# Patient Record
Sex: MALE | Race: WHITE | NOT HISPANIC OR LATINO | Employment: FULL TIME | URBAN - METROPOLITAN AREA
[De-identification: names, ages, dates, MRNs, and addresses within clinical notes are randomized per-mention and may not be internally consistent; named-entity substitution may affect disease eponyms.]

---

## 2020-02-27 ENCOUNTER — OFFICE VISIT (OUTPATIENT)
Dept: FAMILY MEDICINE | Facility: CLINIC | Age: 30
End: 2020-02-27
Payer: COMMERCIAL

## 2020-02-27 ENCOUNTER — LAB VISIT (OUTPATIENT)
Dept: LAB | Facility: HOSPITAL | Age: 30
End: 2020-02-27
Attending: NURSE PRACTITIONER
Payer: COMMERCIAL

## 2020-02-27 VITALS
SYSTOLIC BLOOD PRESSURE: 110 MMHG | OXYGEN SATURATION: 98 % | WEIGHT: 202 LBS | BODY MASS INDEX: 28.92 KG/M2 | HEIGHT: 70 IN | DIASTOLIC BLOOD PRESSURE: 70 MMHG | HEART RATE: 63 BPM

## 2020-02-27 DIAGNOSIS — G44.89 OTHER HEADACHE SYNDROME: Primary | ICD-10-CM

## 2020-02-27 DIAGNOSIS — G44.89 OTHER HEADACHE SYNDROME: ICD-10-CM

## 2020-02-27 LAB
ALBUMIN SERPL BCP-MCNC: 4.3 G/DL (ref 3.5–5.2)
ALP SERPL-CCNC: 55 U/L (ref 55–135)
ALT SERPL W/O P-5'-P-CCNC: 21 U/L (ref 10–44)
ANION GAP SERPL CALC-SCNC: 7 MMOL/L (ref 8–16)
AST SERPL-CCNC: 21 U/L (ref 10–40)
BASOPHILS # BLD AUTO: 0.02 K/UL (ref 0–0.2)
BASOPHILS NFR BLD: 0.3 % (ref 0–1.9)
BILIRUB SERPL-MCNC: 1.1 MG/DL (ref 0.1–1)
BUN SERPL-MCNC: 22 MG/DL (ref 6–20)
CALCIUM SERPL-MCNC: 9.2 MG/DL (ref 8.7–10.5)
CHLORIDE SERPL-SCNC: 103 MMOL/L (ref 95–110)
CO2 SERPL-SCNC: 28 MMOL/L (ref 23–29)
CREAT SERPL-MCNC: 1.2 MG/DL (ref 0.5–1.4)
DIFFERENTIAL METHOD: ABNORMAL
EOSINOPHIL # BLD AUTO: 0 K/UL (ref 0–0.5)
EOSINOPHIL NFR BLD: 0 % (ref 0–8)
ERYTHROCYTE [DISTWIDTH] IN BLOOD BY AUTOMATED COUNT: 12.2 % (ref 11.5–14.5)
EST. GFR  (AFRICAN AMERICAN): >60 ML/MIN/1.73 M^2
EST. GFR  (NON AFRICAN AMERICAN): >60 ML/MIN/1.73 M^2
GLUCOSE SERPL-MCNC: 162 MG/DL (ref 70–110)
HCT VFR BLD AUTO: 46.1 % (ref 40–54)
HGB BLD-MCNC: 15.5 G/DL (ref 14–18)
IMM GRANULOCYTES # BLD AUTO: 0.02 K/UL (ref 0–0.04)
IMM GRANULOCYTES NFR BLD AUTO: 0.3 % (ref 0–0.5)
LYMPHOCYTES # BLD AUTO: 0.8 K/UL (ref 1–4.8)
LYMPHOCYTES NFR BLD: 10.1 % (ref 18–48)
MCH RBC QN AUTO: 31.2 PG (ref 27–31)
MCHC RBC AUTO-ENTMCNC: 33.6 G/DL (ref 32–36)
MCV RBC AUTO: 93 FL (ref 82–98)
MONOCYTES # BLD AUTO: 0.1 K/UL (ref 0.3–1)
MONOCYTES NFR BLD: 0.9 % (ref 4–15)
NEUTROPHILS # BLD AUTO: 6.8 K/UL (ref 1.8–7.7)
NEUTROPHILS NFR BLD: 88.4 % (ref 38–73)
NRBC BLD-RTO: 0 /100 WBC
PLATELET # BLD AUTO: 139 K/UL (ref 150–350)
PMV BLD AUTO: 11.6 FL (ref 9.2–12.9)
POTASSIUM SERPL-SCNC: 4 MMOL/L (ref 3.5–5.1)
PROT SERPL-MCNC: 7 G/DL (ref 6–8.4)
RBC # BLD AUTO: 4.97 M/UL (ref 4.6–6.2)
SODIUM SERPL-SCNC: 138 MMOL/L (ref 136–145)
WBC # BLD AUTO: 7.69 K/UL (ref 3.9–12.7)

## 2020-02-27 PROCEDURE — 3008F BODY MASS INDEX DOCD: CPT | Mod: S$GLB,,, | Performed by: NURSE PRACTITIONER

## 2020-02-27 PROCEDURE — 80053 COMPREHEN METABOLIC PANEL: CPT

## 2020-02-27 PROCEDURE — 99203 OFFICE O/P NEW LOW 30 MIN: CPT | Mod: 25,S$GLB,, | Performed by: NURSE PRACTITIONER

## 2020-02-27 PROCEDURE — 96372 PR INJECTION,THERAP/PROPH/DIAG2ST, IM OR SUBCUT: ICD-10-PCS | Mod: S$GLB,,, | Performed by: NURSE PRACTITIONER

## 2020-02-27 PROCEDURE — 3008F PR BODY MASS INDEX (BMI) DOCUMENTED: ICD-10-PCS | Mod: S$GLB,,, | Performed by: NURSE PRACTITIONER

## 2020-02-27 PROCEDURE — 96372 THER/PROPH/DIAG INJ SC/IM: CPT | Mod: S$GLB,,, | Performed by: NURSE PRACTITIONER

## 2020-02-27 PROCEDURE — 36415 COLL VENOUS BLD VENIPUNCTURE: CPT

## 2020-02-27 PROCEDURE — 85025 COMPLETE CBC W/AUTO DIFF WBC: CPT

## 2020-02-27 PROCEDURE — 99203 PR OFFICE/OUTPT VISIT, NEW, LEVL III, 30-44 MIN: ICD-10-PCS | Mod: 25,S$GLB,, | Performed by: NURSE PRACTITIONER

## 2020-02-27 RX ORDER — KETOROLAC TROMETHAMINE 30 MG/ML
60 INJECTION, SOLUTION INTRAMUSCULAR; INTRAVENOUS ONCE
Status: COMPLETED | OUTPATIENT
Start: 2020-02-27 | End: 2020-02-27

## 2020-02-27 RX ORDER — DEXAMETHASONE SODIUM PHOSPHATE 4 MG/ML
4 INJECTION, SOLUTION INTRA-ARTICULAR; INTRALESIONAL; INTRAMUSCULAR; INTRAVENOUS; SOFT TISSUE
Status: COMPLETED | OUTPATIENT
Start: 2020-02-27 | End: 2020-02-27

## 2020-02-27 RX ORDER — NAPROXEN 500 MG/1
500 TABLET ORAL 2 TIMES DAILY
Qty: 28 TABLET | Refills: 0 | Status: SHIPPED | OUTPATIENT
Start: 2020-02-27

## 2020-02-27 RX ADMIN — DEXAMETHASONE SODIUM PHOSPHATE 4 MG: 4 INJECTION, SOLUTION INTRA-ARTICULAR; INTRALESIONAL; INTRAMUSCULAR; INTRAVENOUS; SOFT TISSUE at 11:02

## 2020-02-27 RX ADMIN — KETOROLAC TROMETHAMINE 60 MG: 30 INJECTION, SOLUTION INTRAMUSCULAR; INTRAVENOUS at 11:02

## 2020-02-27 NOTE — PROGRESS NOTES
SUBJECTIVE:      Patient ID: Robbi Mendoza is a 29 y.o. male.    Chief Complaint: Headache (x1 week)    Mr Mendoza is here today to establish care. He is complaining of a dull headache for the past week. The headache stared last Thursday, he took otc ibuprofen that day but it did not improve. The next day he tried Excedrin migraine which did not help. He has also tried sudafed and zyrtec without relief. He works as a , says he his healthy. No medical issues. He works out at the gym daily. His headaches have not interfered with his work or going to the gym. The headache is not escalating, not the worst headache. No fever, no nausea   Headache    This is a new problem. The current episode started in the past 7 days. The problem occurs daily. The problem has been unchanged. The pain is located in the right unilateral region. The pain does not radiate. The pain quality is similar to prior headaches. The quality of the pain is described as dull. The pain is mild. Pertinent negatives include no abdominal pain, anorexia, blurred vision, dizziness, eye pain, eye redness, hearing loss, loss of balance, nausea, phonophobia, photophobia, seizures, sinus pressure, tingling, visual change, vomiting or weakness. Nothing aggravates the symptoms. He has tried NSAIDs and Excedrin for the symptoms. The treatment provided mild relief.       Past Surgical History:   Procedure Laterality Date    NASAL SEPTUM SURGERY      SINUS SURGERY       Family History   Problem Relation Age of Onset    Hypertension Father     Cancer Maternal Grandmother       Social History     Socioeconomic History    Marital status:      Spouse name: Not on file    Number of children: Not on file    Years of education: Not on file    Highest education level: Not on file   Occupational History    Not on file   Social Needs    Financial resource strain: Not on file    Food insecurity:     Worry: Not on file     Inability: Not on  file    Transportation needs:     Medical: Not on file     Non-medical: Not on file   Tobacco Use    Smoking status: Never Smoker    Smokeless tobacco: Never Used   Substance and Sexual Activity    Alcohol use: Yes    Drug use: Never    Sexual activity: Yes   Lifestyle    Physical activity:     Days per week: Not on file     Minutes per session: Not on file    Stress: Not on file   Relationships    Social connections:     Talks on phone: Not on file     Gets together: Not on file     Attends [employer] service: Not on file     Active member of club or organization: Not on file     Attends meetings of clubs or organizations: Not on file     Relationship status: Not on file   Other Topics Concern    Not on file   Social History Narrative    Not on file     Current Outpatient Medications   Medication Sig Dispense Refill    naproxen (NAPROSYN) 500 MG tablet Take 1 tablet (500 mg total) by mouth 2 (two) times daily. 28 tablet 0     No current facility-administered medications for this visit.      Review of patient's allergies indicates:  No Known Allergies   History reviewed. No pertinent past medical history.  Past Surgical History:   Procedure Laterality Date    NASAL SEPTUM SURGERY      SINUS SURGERY         Review of Systems   Constitutional: Negative for appetite change, chills, diaphoresis and unexpected weight change.   HENT: Negative for ear discharge, facial swelling, hearing loss, nosebleeds, sinus pressure and trouble swallowing.    Eyes: Negative for blurred vision, photophobia, pain, redness and visual disturbance.   Respiratory: Negative for apnea, choking, shortness of breath and wheezing.    Cardiovascular: Negative for chest pain and palpitations.   Gastrointestinal: Negative for abdominal pain, anorexia, blood in stool, nausea and vomiting.   Endocrine: Negative for polyphagia.   Genitourinary: Negative for difficulty urinating and hematuria.   Musculoskeletal: Negative for gait problem and  "joint swelling.   Skin: Negative for pallor.   Neurological: Positive for headaches. Negative for dizziness, tingling, seizures, speech difficulty, weakness and loss of balance.   Hematological: Does not bruise/bleed easily.   Psychiatric/Behavioral: Negative for agitation, confusion and dysphoric mood.      OBJECTIVE:      Vitals:    02/27/20 1107   BP: 110/70   Pulse: 63   SpO2: 98%   Weight: 91.6 kg (202 lb)   Height: 5' 10" (1.778 m)     Physical Exam   Constitutional: He is oriented to person, place, and time. He appears well-developed and well-nourished.   HENT:   Head: Atraumatic.   Right Ear: Tympanic membrane normal.   Left Ear: Tympanic membrane normal.   Nose: Nose normal.   Mouth/Throat: Oropharynx is clear and moist and mucous membranes are normal.   Eyes: Pupils are equal, round, and reactive to light. Conjunctivae and EOM are normal.   Neck: Neck supple. No JVD present. Carotid bruit is not present. No thyromegaly present.   Cardiovascular: Normal rate, regular rhythm, normal heart sounds and intact distal pulses.   Pulmonary/Chest: Effort normal and breath sounds normal. He has no wheezes. He has no rhonchi. He has no rales.   Abdominal: Soft. Bowel sounds are normal. He exhibits no distension. There is no tenderness.   Musculoskeletal: Normal range of motion. He exhibits no edema.   Lymphadenopathy:     He has no cervical adenopathy.   Neurological: He is alert and oriented to person, place, and time. He has normal strength. He displays a negative Romberg sign.   Skin: Skin is warm and dry.   Psychiatric: He has a normal mood and affect. His speech is normal and behavior is normal.   Nursing note and vitals reviewed.     Assessment:       1. Other headache syndrome        Plan:       Other headache syndrome  -     dexamethasone injection 4 mg  -     ketorolac injection 60 mg  -     naproxen (NAPROSYN) 500 MG tablet; Take 1 tablet (500 mg total) by mouth 2 (two) times daily.  Dispense: 28 tablet; " Refill: 0  -     CBC auto differential; Future; Expected date: 02/27/2020  -     Comprehensive metabolic panel; Future; Expected date: 02/27/2020  Discussed with patient if headache escalates report to the ER      Follow up in about 2 weeks (around 3/12/2020) for headache.      2/27/2020 COCO Bob, FNP

## 2020-02-27 NOTE — PATIENT INSTRUCTIONS
Managing Tension-type Headache Symptoms  A tension-type headache can develop slowly. Being aware of the symptoms helps you recognize a headache early. Then you can act to reduce pain and relieve tension. Methods for relieving your symptoms include self-care and medicine.    Tension-type symptoms  The earlier you recognize the symptoms of a tension-type headache, the easier it is to treat. Tension-type headaches may:  · Begin with fatigue, tension, or pain in the neck and shoulders  · Feel like a band around the head  · Be concentrated in the temple, the back of the head, behind the eyes, or in the face  · Come and go, or last for days, weeks, or even longer  · Involve referred pain -- this means that the area that hurts may not be where the problem begins  Self-care during a tension-type headache  When you have a tension-type headache, there are things you can do to relax, loosen muscles, and reduce the pain:  · Brush your scalp lightly with a soft hairbrush.  · Give yourself a massage. Knead the muscles running from your shoulders up the back of your skull. Or ask a friend to gently massage your neck and shoulders.  · Use an ice pack. Wrap a thin cloth around a cold pack, a cold can of soda, or a bag of frozen vegetables. Apply this directly to the place where you feel pain (such as your neck or temples).  · Use moist heat to relax your muscles. Take a warm shower or bath. Or drape a warm, moist towel around your neck and shoulders.  Relieving pain and tension  Over-the-counter or prescription medicine can help relieve pain. Another way to reduce your pain is to use relaxation techniques to loosen tight muscles.  Medicine  Medicine used for tension-type headaches include the following:  · NSAIDs (nonsteroidal anti-inflammatories), such as aspirin and ibuprofen, relieve inflammation and help block pain signals.  · Acetaminophen treats pain, and some formulations contain caffeine.   · Muscle relaxants can reduce  painful muscle contractions.  Taking medicine safely  Be aware that:  · Taking analgesics (pain relievers) or drinking too much coffee may lead to rebound headaches (frequent or severe headaches that can happen if you miss a dose of medication), so take pain medications only as needed. If you think you have these headaches, contact your health care provider.  · Taking too much medication can cause sleep problems or stomach upset. Some over-the-counter headache medications may contain caffeine. These may disrupt sleep and worsen pain.  Relaxation techniques  A , class, book, or tape may help you learn these techniques. One or more of these methods may work for you:  · Deep breathing. Slow, calm, deep breathing can help you relax. Breathe in for a count of 5 or more. Then slowly let the breath out.  · Visualization. Imagining a peaceful, secure scene can give you a sense of control over your body and surroundings.  · Progressive relaxation. This is done by tightening and then releasing muscle groups. Start at the top of your head and work your way down your body. Tighten each muscle group for 5 to 10 seconds. Then release the muscle group for the same amount of time.  · Biofeedback. This is a type of training in which you learn to control certain physical functions and responses. This helps you learn to reduce muscle tension.     Date Last Reviewed: 11/9/2015 © 2000-2017 [employer]. [address] [place] [address], [place] [address]. All rights reserved. This information is not intended as a substitute for professional medical care. Always follow your healthcare professional's instructions.

## 2020-02-28 ENCOUNTER — TELEPHONE (OUTPATIENT)
Dept: FAMILY MEDICINE | Facility: CLINIC | Age: 30
End: 2020-02-28

## 2020-02-28 DIAGNOSIS — R73.9 HYPERGLYCEMIA: Primary | ICD-10-CM

## 2020-09-21 ENCOUNTER — OCCUPATIONAL HEALTH (OUTPATIENT)
Dept: URGENT CARE | Facility: CLINIC | Age: 30
End: 2020-09-21

## 2020-09-21 PROCEDURE — 99499 UNLISTED E&M SERVICE: CPT | Mod: S$GLB,,, | Performed by: NURSE PRACTITIONER

## 2020-09-21 PROCEDURE — 99499 PR PHYSICAL - DOT/CDL: ICD-10-PCS | Mod: S$GLB,,, | Performed by: NURSE PRACTITIONER

## 2021-01-15 ENCOUNTER — CLINICAL SUPPORT (OUTPATIENT)
Dept: OTHER | Facility: CLINIC | Age: 31
End: 2021-01-15
Payer: COMMERCIAL

## 2021-01-15 DIAGNOSIS — Z00.8 ENCOUNTER FOR OTHER GENERAL EXAMINATION: ICD-10-CM

## 2021-01-16 VITALS — BODY MASS INDEX: 27.4 KG/M2 | HEIGHT: 72 IN

## 2021-01-16 LAB
GLUCOSE SERPL-MCNC: 111 MG/DL (ref 60–140)
HDLC SERPL-MCNC: 72 MG/DL
POC CHOLESTEROL, TOTAL: 174 MG/DL
TRIGL SERPL-MCNC: 44 MG/DL

## 2021-10-27 ENCOUNTER — CLINICAL SUPPORT (OUTPATIENT)
Dept: OTHER | Facility: CLINIC | Age: 31
End: 2021-10-27
Payer: COMMERCIAL

## 2021-10-27 DIAGNOSIS — Z00.8 ENCOUNTER FOR OTHER GENERAL EXAMINATION: ICD-10-CM

## 2021-10-28 VITALS — HEIGHT: 72 IN | BODY MASS INDEX: 27.4 KG/M2

## 2021-10-28 LAB
GLUCOSE SERPL-MCNC: 84 MG/DL (ref 60–140)
HDLC SERPL-MCNC: 57 MG/DL
POC CHOLESTEROL, LDL (DOCK): 94 MG/DL
POC CHOLESTEROL, TOTAL: 164 MG/DL
TRIGL SERPL-MCNC: 61 MG/DL

## 2024-08-07 ENCOUNTER — OCCUPATIONAL HEALTH (OUTPATIENT)
Dept: URGENT CARE | Facility: CLINIC | Age: 34
End: 2024-08-07

## 2024-08-07 DIAGNOSIS — Z00.00 ANNUAL PHYSICAL EXAM: Primary | ICD-10-CM

## 2024-08-07 PROCEDURE — 99499 UNLISTED E&M SERVICE: CPT | Mod: ,,, | Performed by: STUDENT IN AN ORGANIZED HEALTH CARE EDUCATION/TRAINING PROGRAM

## 2024-08-07 PROCEDURE — 71046 X-RAY EXAM CHEST 2 VIEWS: CPT | Mod: ,,, | Performed by: RADIOLOGY

## 2024-08-07 PROCEDURE — 84153 ASSAY OF PSA TOTAL: CPT | Mod: ,,, | Performed by: STUDENT IN AN ORGANIZED HEALTH CARE EDUCATION/TRAINING PROGRAM

## 2024-08-07 PROCEDURE — 94010 BREATHING CAPACITY TEST: CPT | Mod: ,,, | Performed by: STUDENT IN AN ORGANIZED HEALTH CARE EDUCATION/TRAINING PROGRAM

## 2024-08-07 PROCEDURE — 80305 DRUG TEST PRSMV DIR OPT OBS: CPT | Mod: ,,, | Performed by: STUDENT IN AN ORGANIZED HEALTH CARE EDUCATION/TRAINING PROGRAM

## 2024-08-07 PROCEDURE — 86580 TB INTRADERMAL TEST: CPT | Mod: ,,, | Performed by: STUDENT IN AN ORGANIZED HEALTH CARE EDUCATION/TRAINING PROGRAM

## 2024-08-07 PROCEDURE — 71046 X-RAY EXAM CHEST 2 VIEWS: CPT | Mod: ,,, | Performed by: STUDENT IN AN ORGANIZED HEALTH CARE EDUCATION/TRAINING PROGRAM

## 2024-08-07 PROCEDURE — 86701 HIV-1ANTIBODY: CPT | Mod: QW,,, | Performed by: STUDENT IN AN ORGANIZED HEALTH CARE EDUCATION/TRAINING PROGRAM

## 2024-08-07 PROCEDURE — 80053 COMPREHEN METABOLIC PANEL: CPT | Mod: QW,,, | Performed by: STUDENT IN AN ORGANIZED HEALTH CARE EDUCATION/TRAINING PROGRAM

## 2024-08-07 PROCEDURE — 99172 OCULAR FUNCTION SCREEN: CPT | Mod: ,,, | Performed by: STUDENT IN AN ORGANIZED HEALTH CARE EDUCATION/TRAINING PROGRAM

## 2024-08-07 PROCEDURE — 99080 SPECIAL REPORTS OR FORMS: CPT | Mod: ,,, | Performed by: STUDENT IN AN ORGANIZED HEALTH CARE EDUCATION/TRAINING PROGRAM

## 2024-08-07 PROCEDURE — 92552 PURE TONE AUDIOMETRY AIR: CPT | Mod: ,,, | Performed by: STUDENT IN AN ORGANIZED HEALTH CARE EDUCATION/TRAINING PROGRAM

## 2024-08-07 PROCEDURE — 85025 COMPLETE CBC W/AUTO DIFF WBC: CPT | Mod: QW,,, | Performed by: STUDENT IN AN ORGANIZED HEALTH CARE EDUCATION/TRAINING PROGRAM

## 2024-08-07 PROCEDURE — 93000 ELECTROCARDIOGRAM COMPLETE: CPT | Mod: ,,, | Performed by: STUDENT IN AN ORGANIZED HEALTH CARE EDUCATION/TRAINING PROGRAM

## 2024-08-08 LAB
ALBUMIN SERPL-MCNC: 4.6 G/DL (ref 4.1–5.1)
ALP SERPL-CCNC: 83 IU/L (ref 44–121)
ALT SERPL-CCNC: 42 IU/L (ref 0–44)
AST SERPL-CCNC: 25 IU/L (ref 0–40)
BASOPHILS # BLD AUTO: 0 X10E3/UL (ref 0–0.2)
BASOPHILS NFR BLD AUTO: 1 %
BILIRUB SERPL-MCNC: 0.8 MG/DL (ref 0–1.2)
BUN SERPL-MCNC: 19 MG/DL (ref 6–20)
BUN/CREAT SERPL: 18 (ref 9–20)
CALCIUM SERPL-MCNC: 9.9 MG/DL (ref 8.7–10.2)
CHLORIDE SERPL-SCNC: 102 MMOL/L (ref 96–106)
CHOLEST SERPL-MCNC: 195 MG/DL (ref 100–199)
CO2 SERPL-SCNC: 22 MMOL/L (ref 20–29)
CREAT SERPL-MCNC: 1.07 MG/DL (ref 0.76–1.27)
EOSINOPHIL # BLD AUTO: 0.1 X10E3/UL (ref 0–0.4)
EOSINOPHIL NFR BLD AUTO: 1 %
ERYTHROCYTE [DISTWIDTH] IN BLOOD BY AUTOMATED COUNT: 12.6 % (ref 11.6–15.4)
EST. GFR  (NO RACE VARIABLE): 95 ML/MIN/1.73
GLOBULIN SER CALC-MCNC: 2.6 G/DL (ref 1.5–4.5)
GLUCOSE SERPL-MCNC: 100 MG/DL (ref 70–99)
HCT VFR BLD AUTO: 51.3 % (ref 37.5–51)
HDLC SERPL-MCNC: 56 MG/DL
HGB BLD-MCNC: 16.9 G/DL (ref 13–17.7)
HIV 1+2 AB+HIV1 P24 AG SERPL QL IA: NON REACTIVE
IMM GRANULOCYTES # BLD AUTO: 0 X10E3/UL (ref 0–0.1)
IMM GRANULOCYTES NFR BLD AUTO: 1 %
LDLC SERPL CALC-MCNC: 124 MG/DL (ref 0–99)
LYMPHOCYTES # BLD AUTO: 2.2 X10E3/UL (ref 0.7–3.1)
LYMPHOCYTES NFR BLD AUTO: 34 %
MCH RBC QN AUTO: 31.1 PG (ref 26.6–33)
MCHC RBC AUTO-ENTMCNC: 32.9 G/DL (ref 31.5–35.7)
MCV RBC AUTO: 95 FL (ref 79–97)
MONOCYTES # BLD AUTO: 0.5 X10E3/UL (ref 0.1–0.9)
MONOCYTES NFR BLD AUTO: 7 %
NEUTROPHILS # BLD AUTO: 3.5 X10E3/UL (ref 1.4–7)
NEUTROPHILS NFR BLD AUTO: 56 %
PLATELET # BLD AUTO: 194 X10E3/UL (ref 150–450)
POTASSIUM SERPL-SCNC: 4.3 MMOL/L (ref 3.5–5.2)
PROT SERPL-MCNC: 7.2 G/DL (ref 6–8.5)
RBC # BLD AUTO: 5.43 X10E6/UL (ref 4.14–5.8)
SODIUM SERPL-SCNC: 139 MMOL/L (ref 134–144)
TRIGL SERPL-MCNC: 83 MG/DL (ref 0–149)
VLDLC SERPL CALC-MCNC: 15 MG/DL (ref 5–40)
WBC # BLD AUTO: 6.3 X10E3/UL (ref 3.4–10.8)

## 2025-03-29 ENCOUNTER — OCCUPATIONAL HEALTH (OUTPATIENT)
Dept: URGENT CARE | Facility: CLINIC | Age: 35
End: 2025-03-29

## 2025-03-29 PROCEDURE — 80305 DRUG TEST PRSMV DIR OPT OBS: CPT | Mod: S$GLB,,, | Performed by: EMERGENCY MEDICINE

## 2025-06-18 ENCOUNTER — OCCUPATIONAL HEALTH (OUTPATIENT)
Dept: URGENT CARE | Facility: CLINIC | Age: 35
End: 2025-06-18

## 2025-06-18 DIAGNOSIS — Z00.00 ROUTINE GENERAL MEDICAL EXAMINATION AT A HEALTH CARE FACILITY: Primary | ICD-10-CM

## 2025-06-18 PROCEDURE — 99499 UNLISTED E&M SERVICE: CPT | Mod: S$GLB,,, | Performed by: EMERGENCY MEDICINE

## 2025-06-19 LAB
ALBUMIN SERPL-MCNC: 4.6 G/DL (ref 4.1–5.1)
ALP SERPL-CCNC: 74 IU/L (ref 44–121)
ALT SERPL-CCNC: 18 IU/L (ref 0–44)
AST SERPL-CCNC: 17 IU/L (ref 0–40)
BASOPHILS # BLD AUTO: 0 X10E3/UL (ref 0–0.2)
BASOPHILS NFR BLD AUTO: 1 %
BILIRUB SERPL-MCNC: 0.4 MG/DL (ref 0–1.2)
BUN SERPL-MCNC: 29 MG/DL (ref 6–20)
BUN/CREAT SERPL: 24 (ref 9–20)
CALCIUM SERPL-MCNC: 9.4 MG/DL (ref 8.7–10.2)
CHLORIDE SERPL-SCNC: 104 MMOL/L (ref 96–106)
CHOLEST SERPL-MCNC: 174 MG/DL (ref 100–199)
CO2 SERPL-SCNC: 24 MMOL/L (ref 20–29)
CREAT SERPL-MCNC: 1.22 MG/DL (ref 0.76–1.27)
EOSINOPHIL # BLD AUTO: 0.1 X10E3/UL (ref 0–0.4)
EOSINOPHIL NFR BLD AUTO: 2 %
ERYTHROCYTE [DISTWIDTH] IN BLOOD BY AUTOMATED COUNT: 12.5 % (ref 11.6–15.4)
GFR SERPLBLD CREATININE-BSD FMLA CKD-EPI: 80 ML/MIN/1.73
GLOBULIN SER CALC-MCNC: 2 G/DL (ref 1.5–4.5)
GLUCOSE SERPL-MCNC: 108 MG/DL (ref 70–99)
HCT VFR BLD AUTO: 47.9 % (ref 37.5–51)
HDLC SERPL-MCNC: 57 MG/DL
HGB BLD-MCNC: 15.7 G/DL (ref 13–17.7)
HIV 1+2 AB+HIV1 P24 AG SERPL QL IA: NON REACTIVE
IMM GRANULOCYTES # BLD AUTO: 0 X10E3/UL (ref 0–0.1)
IMM GRANULOCYTES NFR BLD AUTO: 0 %
LDLC SERPL CALC-MCNC: 108 MG/DL (ref 0–99)
LYMPHOCYTES # BLD AUTO: 2 X10E3/UL (ref 0.7–3.1)
LYMPHOCYTES NFR BLD AUTO: 40 %
MCH RBC QN AUTO: 31.4 PG (ref 26.6–33)
MCHC RBC AUTO-ENTMCNC: 32.8 G/DL (ref 31.5–35.7)
MCV RBC AUTO: 96 FL (ref 79–97)
MONOCYTES # BLD AUTO: 0.4 X10E3/UL (ref 0.1–0.9)
MONOCYTES NFR BLD AUTO: 9 %
NEUTROPHILS # BLD AUTO: 2.6 X10E3/UL (ref 1.4–7)
NEUTROPHILS NFR BLD AUTO: 48 %
PLATELET # BLD AUTO: 144 X10E3/UL (ref 150–450)
POTASSIUM SERPL-SCNC: 4.2 MMOL/L (ref 3.5–5.2)
PROT SERPL-MCNC: 6.6 G/DL (ref 6–8.5)
RBC # BLD AUTO: 5 X10E6/UL (ref 4.14–5.8)
SODIUM SERPL-SCNC: 139 MMOL/L (ref 134–144)
TRIGL SERPL-MCNC: 45 MG/DL (ref 0–149)
VLDLC SERPL CALC-MCNC: 9 MG/DL (ref 5–40)
WBC # BLD AUTO: 5.2 X10E3/UL (ref 3.4–10.8)